# Patient Record
Sex: FEMALE | Race: WHITE | NOT HISPANIC OR LATINO | Employment: OTHER | URBAN - METROPOLITAN AREA
[De-identification: names, ages, dates, MRNs, and addresses within clinical notes are randomized per-mention and may not be internally consistent; named-entity substitution may affect disease eponyms.]

---

## 2017-10-10 DIAGNOSIS — M54.12 CERVICAL NEURITIS: Primary | ICD-10-CM

## 2017-10-11 ENCOUNTER — TELEPHONE (OUTPATIENT)
Dept: NEUROSURGERY | Facility: CLINIC | Age: 82
End: 2017-10-11

## 2017-10-11 DIAGNOSIS — M54.12 CERVICAL NEURITIS: Primary | ICD-10-CM

## 2017-10-11 NOTE — TELEPHONE ENCOUNTER
----- Message from Catherine Jones sent at 10/11/2017  2:52 PM CDT -----  Contact: Dr Diop (9th Floor) office, Lanette Gama called in about wanting to schedule appt.  Office out in referral for Neurology and was advised that it was neurosurgery.  Pt has cervical neuritis. All dates were exhausted and office would like the appt asap.       Please contact Lanette at Dr [place]op [place] at [phone]    Thank You

## 2017-10-11 NOTE — TELEPHONE ENCOUNTER
Spoke with Lanette from Dr. Cheng's office. Advised we are a surgical. States patient is 89 years old and does not require surgical intervention. States she thought the patient needed Neurology as instructed by the referral from Dr. Diop but was advised otherwise upon requesting an appointment. States she will clarify with Dr. Diop and call back if warranted. RN forwarded message to Neurology supervisor for assistance.

## 2017-10-12 ENCOUNTER — TELEPHONE (OUTPATIENT)
Dept: NEUROLOGY | Facility: CLINIC | Age: 82
End: 2017-10-12

## 2017-10-12 NOTE — TELEPHONE ENCOUNTER
Confirmed with Lanette in Dr. Jadon Maya's office that appt has been made with Dr. Rizvi on 10/16/17 @ 12:20pm for Neuro eval. Subsequent appt to be arranged for pt to be seen byDr. Jadon Maya on the same day and Lanette will update pt's son accordingly.

## 2017-10-12 NOTE — TELEPHONE ENCOUNTER
----- Message from Natalee Rodrigues RN sent at 10/11/2017  3:27 PM CDT -----  Contact: Dr Diop (9th Floor) office, Lanette  Can you assist? Patient is not surgical.  ----- Message -----  From: Catherine Jones  Sent: 10/11/2017   2:52 PM  To: Yanick Sauceda Staff    Lanette called in about wanting to schedule appt.  Office out in referral for Neurology and was advised that it was neurosurgery.  Pt has cervical neuritis. All dates were exhausted and office would like the appt asa.       Please contact Lan[place] Dr [place]op [place] at [phone]    Thank You

## 2017-10-16 ENCOUNTER — OFFICE VISIT (OUTPATIENT)
Dept: PULMONOLOGY | Facility: CLINIC | Age: 82
End: 2017-10-16
Payer: MEDICARE

## 2017-10-16 ENCOUNTER — HOSPITAL ENCOUNTER (OUTPATIENT)
Dept: RADIOLOGY | Facility: HOSPITAL | Age: 82
Discharge: HOME | End: 2017-10-16
Attending: INTERNAL MEDICINE
Payer: MEDICARE

## 2017-10-16 ENCOUNTER — HOSPITAL ENCOUNTER (OUTPATIENT)
Dept: CARDIOLOGY | Facility: CLINIC | Age: 82
Discharge: HOME | End: 2017-10-16
Payer: MEDICARE

## 2017-10-16 ENCOUNTER — OFFICE VISIT (OUTPATIENT)
Dept: NEUROLOGY | Facility: CLINIC | Age: 82
End: 2017-10-16
Payer: MEDICARE

## 2017-10-16 VITALS — HEIGHT: 66 IN | WEIGHT: 121.25 LBS | OXYGEN SATURATION: 98 % | BODY MASS INDEX: 19.49 KG/M2 | HEART RATE: 73 BPM

## 2017-10-16 VITALS
HEIGHT: 66 IN | BODY MASS INDEX: 19.49 KG/M2 | SYSTOLIC BLOOD PRESSURE: 122 MMHG | WEIGHT: 121.25 LBS | DIASTOLIC BLOOD PRESSURE: 78 MMHG | HEART RATE: 77 BPM

## 2017-10-16 DIAGNOSIS — R06.02 SOB (SHORTNESS OF BREATH): ICD-10-CM

## 2017-10-16 DIAGNOSIS — G89.29 NECK PAIN, CHRONIC: Primary | ICD-10-CM

## 2017-10-16 DIAGNOSIS — G89.29 NECK PAIN, CHRONIC: ICD-10-CM

## 2017-10-16 DIAGNOSIS — M54.2 NECK PAIN, CHRONIC: Primary | ICD-10-CM

## 2017-10-16 DIAGNOSIS — M54.2 CERVICALGIA: Primary | ICD-10-CM

## 2017-10-16 DIAGNOSIS — M54.2 NECK PAIN, CHRONIC: ICD-10-CM

## 2017-10-16 DIAGNOSIS — E06.9 HYPERTHYROIDITIS: ICD-10-CM

## 2017-10-16 DIAGNOSIS — E78.49 OTHER HYPERLIPIDEMIA: ICD-10-CM

## 2017-10-16 DIAGNOSIS — M54.81 OCCIPITAL NEURALGIA OF RIGHT SIDE: Primary | ICD-10-CM

## 2017-10-16 PROCEDURE — 99999 PR PBB SHADOW E&M-EST. PATIENT-LVL III: CPT | Mod: PBBFAC,,, | Performed by: PSYCHIATRY & NEUROLOGY

## 2017-10-16 PROCEDURE — 64405 NJX AA&/STRD GR OCPL NRV: CPT | Mod: S$PBB,RT,, | Performed by: NURSE PRACTITIONER

## 2017-10-16 PROCEDURE — 72050 X-RAY EXAM NECK SPINE 4/5VWS: CPT | Mod: TC

## 2017-10-16 PROCEDURE — 71020 XR CHEST PA AND LATERAL: CPT | Mod: TC

## 2017-10-16 PROCEDURE — 99213 OFFICE O/P EST LOW 20 MIN: CPT | Mod: PBBFAC,25,27 | Performed by: PSYCHIATRY & NEUROLOGY

## 2017-10-16 PROCEDURE — 64405 NJX AA&/STRD GR OCPL NRV: CPT | Mod: PBBFAC,RT | Performed by: NURSE PRACTITIONER

## 2017-10-16 PROCEDURE — 72050 X-RAY EXAM NECK SPINE 4/5VWS: CPT | Mod: 26,,, | Performed by: RADIOLOGY

## 2017-10-16 PROCEDURE — 93010 ELECTROCARDIOGRAM REPORT: CPT | Mod: S$PBB,,, | Performed by: INTERNAL MEDICINE

## 2017-10-16 PROCEDURE — 71020 XR CHEST PA AND LATERAL: CPT | Mod: 26,,, | Performed by: RADIOLOGY

## 2017-10-16 PROCEDURE — 99212 OFFICE O/P EST SF 10 MIN: CPT | Mod: PBBFAC,25,27 | Performed by: NURSE PRACTITIONER

## 2017-10-16 PROCEDURE — 99205 OFFICE O/P NEW HI 60 MIN: CPT | Mod: S$PBB,,, | Performed by: PSYCHIATRY & NEUROLOGY

## 2017-10-16 PROCEDURE — 99999 PR PBB SHADOW E&M-EST. PATIENT-LVL III: CPT | Mod: PBBFAC,,, | Performed by: INTERNAL MEDICINE

## 2017-10-16 PROCEDURE — 99205 OFFICE O/P NEW HI 60 MIN: CPT | Mod: S$PBB,,, | Performed by: INTERNAL MEDICINE

## 2017-10-16 PROCEDURE — 93005 ELECTROCARDIOGRAM TRACING: CPT | Mod: PBBFAC | Performed by: INTERNAL MEDICINE

## 2017-10-16 PROCEDURE — 99213 OFFICE O/P EST LOW 20 MIN: CPT | Mod: PBBFAC,25 | Performed by: INTERNAL MEDICINE

## 2017-10-16 PROCEDURE — 99999 PR PBB SHADOW E&M-EST. PATIENT-LVL II: CPT | Mod: PBBFAC,,, | Performed by: NURSE PRACTITIONER

## 2017-10-16 PROCEDURE — 99499 UNLISTED E&M SERVICE: CPT | Mod: S$PBB,,, | Performed by: NURSE PRACTITIONER

## 2017-10-16 RX ORDER — ALPRAZOLAM 0.25 MG/1
0.25 TABLET ORAL 3 TIMES DAILY PRN
COMMUNITY

## 2017-10-16 RX ORDER — ERYTHROMYCIN 5 MG/G
OINTMENT OPHTHALMIC
COMMUNITY

## 2017-10-16 RX ORDER — CHOLECALCIFEROL (VITAMIN D3) 25 MCG
1000 TABLET ORAL DAILY
COMMUNITY

## 2017-10-16 RX ORDER — UBIDECARENONE 75 MG
500 CAPSULE ORAL DAILY
COMMUNITY

## 2017-10-16 RX ORDER — TRAMADOL HYDROCHLORIDE 50 MG/1
50 TABLET ORAL EVERY 6 HOURS PRN
COMMUNITY

## 2017-10-16 RX ORDER — TRIAMTERENE/HYDROCHLOROTHIAZID 37.5-25 MG
1 TABLET ORAL DAILY
COMMUNITY

## 2017-10-16 RX ORDER — GABAPENTIN 100 MG/1
CAPSULE ORAL
Qty: 180 CAPSULE | Refills: 1 | Status: SHIPPED | OUTPATIENT
Start: 2017-10-16

## 2017-10-16 RX ORDER — DICLOFENAC SODIUM 10 MG/G
2 GEL TOPICAL DAILY
Qty: 100 G | Refills: 5 | Status: SHIPPED | OUTPATIENT
Start: 2017-10-16

## 2017-10-16 RX ORDER — ASPIRIN 81 MG/1
81 TABLET ORAL DAILY
COMMUNITY

## 2017-10-16 NOTE — LETTER
October 18, 2017      Clem Rizvi MD  [address] [facility] [address]           [facility] Neurology  [address]  [facility] [address]  Phone: [phone]  Fax: [phone]          Patient: Rona Pike   MR Number: [id number]   YOB: 1927   Date of Visit: 10/16/2017       Dear Dr. Clem Rizvi:    Thank you for referring Rona Pike to me for evaluation. Attached you will find relevant portions of my assessment and plan of care.    If you have questions, please do not hesitate to call me. I look forward to following Rona Pike along with you.    Sincerely,    Clementine Trevino, [facility]    Enclosure  CC:  No Recipients    If you would like to receive this communication electronically, please contact [email or web address] or [phone] to request more information on [employer] Link access.    For providers and/or their staff who would like to refer a patient to [facility], please contact us through our one-stop-shop provider referral line, [facility], at [phone].    If you feel you have received this communication in error or would no longer like to receive these types of communications, please e-mail [email or web address]

## 2017-10-16 NOTE — PROGRESS NOTES
Subjective:       Patient ID: Rona Pike is a 89 y.o. female.    Chief Complaint: Fatigue (right head pain)    HPI  89 yo female comes with her son,  of President of [employer] for assessment of pain in her scalp on the right side. She lives alone and is independent and has outside medical records that show that she has been diagnosed with cervical arthritis and neuralgia. She had dry needling and PT and got better but now is having more symptoms. Her other complaint is she is bit unstable when she walks. NO hx of falls. She is remarkably normal today.  ON  Physical exam she has a trigger points in the superior portion of her right trapezius. Will get Cervical Spine \views and labs to exlude PMR-- she has no symptoms of vision disturbance or other manifestations of PMR. Can  Get  Up from a seated position with no difficulty.      No flowsheet data found.]  Review of Systems    Objective:      Physical Exam        Assessment:       1. SOB (shortness of breath)    2. Neck pain, chronic    3. Other hyperlipidemia    4. Hyperthyroiditis        Outpatient Encounter Prescriptions as of 10/16/2017   Medication Sig Dispense Refill    alprazolam (XANAX) 0.25 MG tablet Take 0.25 mg by mouth 3 (three) times daily as needed for Anxiety.      aspirin (ECOTRIN) 81 MG EC tablet Take 81 mg by mouth once daily.      cyanocobalamin (VITAMIN B-12) 500 MCG tablet Take 500 mcg by mouth once daily.      erythromycin (ROMYCIN) ophthalmic ointment as needed.      ranitidine (ZANTAC) 150 MG tablet Take 150 mg by mouth as needed for Heartburn.      tramadol (ULTRAM) 50 mg tablet Take 50 mg by mouth every 6 (six) hours as needed for Pain.      triamterene-hydrochlorothiazide 37.5-25 mg (MAXZIDE-25) 37.5-25 mg per tablet Take 1 tablet by mouth once daily.      vitamin D 1000 units Tab Take 1,000 Units by mouth once daily.       No facility-administered encounter medications on file as of 10/16/2017.      Orders  Placed This Encounter   Procedures    X-Ray Chest PA And Lateral     Standing Status:   Future     Number of Occurrences:   1     Standing Expiration Date:   10/16/2018    X-Ray Cervical Spine AP Lat with Flexion  Extension     Standing Status:   Future     Number of Occurrences:   1     Standing Expiration Date:   10/16/2018     Order Specific Question:   May the Radiologist modify the order per protocol to meet the clinical needs of the patient?     Answer:   Yes    CBC auto differential     Standing Status:   Future     Number of Occurrences:   1     Standing Expiration Date:   10/16/2018    Sedimentation rate, manual     Standing Status:   Future     Number of Occurrences:   1     Standing Expiration Date:   10/16/2018    TSH     Standing Status:   Future     Number of Occurrences:   1     Standing Expiration Date:   10/16/2018    Comprehensive metabolic panel     Standing Status:   Future     Number of Occurrences:   1     Standing Expiration Date:   12/15/2018    Lipid panel     Standing Status:   Future     Number of Occurrences:   1     Standing Expiration Date:   10/16/2018    EKG 12-lead     Standing Status:   Future     Number of Occurrences:   1     Standing Expiration Date:   10/16/2018     Order Specific Question:   Diagnosis     Answer:   Dyspnea [228440]     Plan:        Labs Drawn this AM:CBC normal ESR pending, Cervical Spine DJD of moderate degree. Chest x-ray is clear no evidence of pathology in the apices. To see neurology later today. Probably would benefit from a cervical pillow at night since lying down tends to aggravated the situation. May benefit from a local injection of the trigger point.

## 2017-10-16 NOTE — PROGRESS NOTES
[facility] - NEUROLOGY  [facility] [place] [facility]    Date: October 16, 2017   Patient Name: Rona Pike   MRN: [id number]   PCP: Jadon Maya  Referring Provider: Arun Maya, *    Assessment:      This is Rona Pike, 89 y.o. female with many years of occipital neuralgia and XR today showing significant C-spine degenerative disease (images reviewed).  She should have MRI C-spine to rule out high cervical radiculopathy.  We discussed possible treatments and potential referral for cryoablation with [facility] if symptoms remain refractory.     Plan:      -  Voltaren gel qhs  -  -300mg qhs, advised to monitor for sedation/dizziness  -  Occipital nerve block today  -  Prescription faxed for MRI C-spine close to her home in [place]    Will call if symptoms do not improve       I discussed side effects of the medications. I asked the patient to stop the medication if she notices serious adverse effects as we discussed and to seek immediate medical attention at an ER.     Clem Rizvi MD  [facility]   Department of Neurology    Subjective:      HPI:   Ms. Rona Pike is a 89 y.o. female who presents for headaches, she presents with her son who provides much of her history    She has had right occipital pain for the past 25 years but this has become significantly worse over the past several months and is now present most days, severe enough to interfere with her daily activities.  She will often awaken from sleep with significant pain, notes that pain tends to vary with what pillow she is using.  She has done physical therapy with only transient relief, has some benefit from tramadol and voltaren gel.    PAST MEDICAL HISTORY:  Past Medical History:   Diagnosis Date    Anxiety     Hypertension        PAST SURGICAL HISTORY:  Past Surgical History:   Procedure Laterality Date    HERNIA REPAIR      HYSTERECTOMY      WRIST FRACTURE SURGERY Left   "      CURRENT MEDS:  Current Outpatient Prescriptions   Medication Sig Dispense Refill    alprazolam (XANAX) 0.25 MG tablet Take 0.25 mg by mouth 3 (three) times daily as needed for Anxiety.      aspirin (ECOTRIN) 81 MG EC tablet Take 81 mg by mouth once daily.      cyanocobalamin (VITAMIN B-12) 500 MCG tablet Take 500 mcg by mouth once daily.      erythromycin (ROMYCIN) ophthalmic ointment as needed.      ranitidine (ZANTAC) 150 MG tablet Take 150 mg by mouth as needed for Heartburn.      tramadol (ULTRAM) 50 mg tablet Take 50 mg by mouth every 6 (six) hours as needed for Pain.      triamterene-hydrochlorothiazide 37.5-25 mg (MAXZIDE-25) 37.5-25 mg per tablet Take 1 tablet by mouth once daily.      vitamin D 1000 units Tab Take 1,000 Units by mouth once daily.       No current facility-administered medications for this visit.        ALLERGIES:  Review of patient's allergies indicates:  No Known Allergies    FAMILY HISTORY:  No family history on file.    SOCIAL HISTORY:  Social History   Substance Use Topics    Smoking status: Never Smoker    Smokeless tobacco: Never Used    Alcohol use Not on file       Review of Systems:  12 review of systems is negative except for the symptoms mentioned in HPI.        Objective:     Vitals:    10/16/17 1224   BP: 122/78   Pulse: 77   Weight: 55 kg (121 lb 4.1 oz)   Height: 5' 6" (1.676 m)       General: NAD, well nourished   Eyes: no tearing, discharge, no erythema   ENT: moist mucous membranes of the oral cavity, nares patent    Neck: Supple, full range of motion  Cardiovascular: Warm and well perfused, pulses equal and symmetrical  Lungs: Normal work of breathing, normal chest wall excursions  Skin: No rash, lesions, or breakdown on exposed skin  Psychiatry: Mood and affect are appropriate   Abdomen: soft, non tender, non distended  Extremeties: No cyanosis, clubbing or edema.    Neurological   MENTAL STATUS: Alert and oriented to person, place, and time. Attention " and concentration within normal limits. Speech without dysarthria, able to name and repeat without difficulty. Recent and remote memory within normal limits   CRANIAL NERVES: Visual fields intact. PERRL. EOMI. Facial sensation intact. Face symmetrical. Hearing grossly intact. Full shoulder shrug bilaterally. Tongue protrudes midline   SENSORY: Sensation is intact to light touch throughout.  Negative Romberg.  Trigger points over R>L trapezius and bilateral occipital ridge  MOTOR: Normal bulk and tone. No pronator drift.  5/5 deltoid, biceps, triceps, interosseous, hand  bilaterally. 5/5 iliopsoas, knee extension/flexion, foot dorsi/plantarflexion bilaterally.    REFLEXES: Toes down going bilaterally.   CEREBELLAR/COORDINATION/GAIT: Gait steady with normal arm swing and stride length.  Heel to shin intact. Finger to nose intact. Normal rapid alternating movements.

## 2017-10-16 NOTE — LETTER
October 16, 2017      Arun Maya MD  [address]  [facility] [address]           [facility]  [address]  [facility] [address]  Phone: [phone]  Fax: [phone]          Patient: Rona Pike   MR Number: [id number]   YOB: 1927   Date of Visit: 10/16/2017       Dear Dr. Arun Maya:    Thank you for referring Rona Pike to me for evaluation. Attached you will find relevant portions of my assessment and plan of care.    If you have questions, please do not hesitate to call me. I look forward to following Rona Pike along with you.    Sincerely,    Clem Rizvi MD    Enclosure  CC:  No Recipients    If you would like to receive this communication electronically, please contact [email or web address] or [phone] to request more information on [employer] Link access.    For providers and/or their staff who would like to refer a patient to [facility], please contact us through our one-stop-shop provider referral line, [facility], at [phone].    If you feel you have received this communication in error or would no longer like to receive these types of communications, please e-mail [email or web address]

## 2017-10-16 NOTE — PROGRESS NOTES
[facility], Department of Neurology  [facility] [place] [address]  Phone # [phone]  Fax # [phone]  SUBJECTIVE/OBJECTIVE:  Patient ID: Rona Pike   Chief Complaint: Headache    History of Present Illness:   Rona Pike is a 89 y.o. female with history of occipital neuralgia, patient seen by Dr. Rizvi today who requests she have a nerve block performed to help with diagnosis.  Patient is with her son who also provides some history.      Procedure Note:   GONB (Greater Occipital Nerve Block): After informed consent was obtained (a copy was given to office staff to scan into the EMR), the patient was asked to remain in a sitting position her head resting prone on her chest. The area was prepped using alcohol swabs. 2% lidocaine 2 mL and 40 mg depomedrol was drawn up utilizing a 21 gauge needle. The occipital trigger points were palpated utilizing latex gloves, a 27 gauge needle and aspiration occured to ensure no medication was introduced into the blood stream during the technique. The medication was delivered RIGHT (all of the above was duplicated for the opposite side) in sites 1) midway between the inion and mastoid along the occipital ridge, 2) 2 finger breaths superior lateral to the first injection and 3) 2 finger breaths superior medial to the first injection. The patient tolerated the procedure well with no active bleeding, erythema, or discharge.  The patient was assessed and allowed to leave after ten minutes.  Findings from repeat exam (10 mins after procedure) showed:  Gen: NAD  Derm: no drainage  Neuro: AOx3, EOMI, tongue in midline, FROM of all extremities, gait WNL   Pre-Procedure Pain- 4  Post-Procedure Pain- 1    ASSESSMENT:  1. Occipital neuralgia of right side        PLAN:  - Right GONB performed in clinic, she tolerated injections well   - need to place order for lidocaine/depo-medrol    Orders Placed This Encounter    lidocaine HCL 20 mg/ml  (2%) injection 2 mL    methylPREDNISolone acetate injection 40 mg     The patient verbalizes understanding and agreement with the treatment plan. Questions were sought and answered to her stated verbal satisfaction.    CC: MD Clementine Gibbons, FERNYP-C  [facility]  [phone]

## 2017-10-18 RX ORDER — METHYLPREDNISOLONE ACETATE 40 MG/ML
40 INJECTION, SUSPENSION INTRA-ARTICULAR; INTRALESIONAL; INTRAMUSCULAR; SOFT TISSUE
Status: COMPLETED | OUTPATIENT
Start: 2017-10-18 | End: 2017-10-18

## 2017-10-18 RX ORDER — LIDOCAINE HYDROCHLORIDE 20 MG/ML
2 INJECTION, SOLUTION INFILTRATION; PERINEURAL
Status: COMPLETED | OUTPATIENT
Start: 2017-10-18 | End: 2017-10-18

## 2017-10-18 RX ADMIN — METHYLPREDNISOLONE ACETATE 40 MG: 40 INJECTION, SUSPENSION INTRA-ARTICULAR; INTRALESIONAL; INTRAMUSCULAR; SOFT TISSUE at 12:10

## 2017-10-18 RX ADMIN — LIDOCAINE HYDROCHLORIDE 2 ML: 20 INJECTION, SOLUTION INFILTRATION; PERINEURAL at 12:10
